# Patient Record
Sex: FEMALE | Race: WHITE | NOT HISPANIC OR LATINO | Employment: UNEMPLOYED | URBAN - NONMETROPOLITAN AREA
[De-identification: names, ages, dates, MRNs, and addresses within clinical notes are randomized per-mention and may not be internally consistent; named-entity substitution may affect disease eponyms.]

---

## 2018-09-10 ENCOUNTER — TRANSCRIBE ORDERS (OUTPATIENT)
Dept: PHYSICAL THERAPY | Facility: HOSPITAL | Age: 43
End: 2018-09-10

## 2018-09-10 DIAGNOSIS — N94.10 UNSPECIFIED DYSPAREUNIA (CODE): Primary | ICD-10-CM

## 2018-09-20 ENCOUNTER — APPOINTMENT (OUTPATIENT)
Dept: PHYSICAL THERAPY | Facility: HOSPITAL | Age: 43
End: 2018-09-20

## 2018-10-01 ENCOUNTER — HOSPITAL ENCOUNTER (OUTPATIENT)
Dept: PHYSICAL THERAPY | Facility: HOSPITAL | Age: 43
Setting detail: THERAPIES SERIES
Discharge: HOME | End: 2018-10-01

## 2018-10-01 DIAGNOSIS — R10.2 PELVIC PAIN IN FEMALE: Primary | ICD-10-CM

## 2018-10-01 PROCEDURE — 97162 PT EVAL MOD COMPLEX 30 MIN: CPT | Performed by: PHYSICAL THERAPIST
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
## 2018-10-17 ENCOUNTER — HOSPITAL ENCOUNTER (OUTPATIENT)
Dept: PHYSICAL THERAPY | Facility: HOSPITAL | Age: 43
Setting detail: THERAPIES SERIES
Discharge: HOME | End: 2018-10-17

## 2018-10-17 DIAGNOSIS — R10.2 PELVIC PAIN IN FEMALE: Primary | ICD-10-CM

## 2018-10-17 PROCEDURE — 97110 THERAPEUTIC EXERCISES: CPT | Performed by: PHYSICAL THERAPIST

## 2018-10-17 NOTE — THERAPY TREATMENT NOTE
Outpatient Physical Therapy Women's Health Treatment Note  [facility]     Patient Name: Clarence Wells  : 1975  MRN: [id number]  Today's Date: 10/17/2018        Visit Date: 10/17/2018  Visit Number:   Recheck: 18  RTD: none    Visit Dx:    ICD-10-CM ICD-9-CM   1. Pelvic pain in female R10.2 625.9       There is no problem list on file for this patient.             Women's Health     Row Name 10/17/18 0800             Subjective Comments    Subjective Comments About the same this morning.  Really needs to start the estrogen medicine.  But admits busy schedule lately with move/transition to new home.  Plans to start the med tonight.    -SW         Pregnancy Questions    Number of Pregnancies 3  -SW      Number of Children 2  -SW      Type of Previous Deliveries Vaginal   complete episiotomy  -SW         Subjective Pain    Able to rate subjective pain? yes  -SW      Pre-Treatment Pain Level 0  -SW         Pelvic Floor Muscle    Strength (Right) 3: Squeeze with/without lift  -SW      Strength (Left) 3: Squeeze with/without lift  -SW      Symmetry of Sustained Maximal Contraction Symmetrical  -SW      Endurance (Ability to Hold Maximal Contraction) 3 sec  -SW      # of Reps of Maximal Contractions while Maintaining Endurance and Strength 0 unable to hold 10 seconds  -SW      Fast Contraction (# of 1 sec contractions performed) 5  -SW      Interior Muscle Comments unchanged pelvic palpation/external appearance.   -SW         Perineal Observation    Perineal Observation Performed? Yes  -SW         Observation of Contraction in Perineum    Anal [place] Absent  -SW      Perineal Body Lift Present  -SW         Pelvic Floor    Ability to Isolate Contraction of Pelvic Floor Yes  -SW      Overflow from Adjacent Muscles Gluteus Tesfaye  -SW         Cough    Leak None  -SW      Bulge Yes  -SW         SEMG Evaluation    Pelvic Floor Electrode Internal Vaginal  -SW      Baseline Resting Yes  -SW      SEMG  Evaluation Comments Elevated resting tone in supine position to 6-7mv. Able to activate PF in isolation with return, but unable to establish baselines of norm value.  Endurance x 10 sec noted with good stability.  Patient needs downtraining.   -         Education Provided On:    Education Points HEP;Behavioral modifications  -      Method of Delivery Verbal;Demonstration;Written  -      Education Provided To Patient  -      Level of Understanding Teach back education performed;Verbalized;Demonstrated  -      HEP Comments please see exercise portion of note for HEP assignment.   -        User Key  (r) = Recorded By, (t) = Taken By, (c) = Cosigned By    Initials Name Provider Type    Genesis Salazar, PT Physical Therapist              PT Ortho     Row Name 10/17/18 0800       Posture/Observations    Posture- WNL Posture is WNL  -SW    Posture/Observations Comments Patient's mood and judgement good. Normal gait and transfers. Alert and oriented.   -      User Key  (r) = Recorded By, (t) = Taken By, (c) = Cosigned By    Initials Name Provider Type    Genesis Salazar, PT Physical Therapist                           PT Assessment/Plan     Row Name 10/17/18 0800          PT Assessment    Functional Limitations Performance in leisure activities;Performance in self-care ADL;Other (comment)   intimacy with partner  -     Impairments Impaired muscle endurance;Impaired muscle length;Muscle strength;Motor function;Pain  -     Assessment Comments Patient shows increased tone to PF which is primary reason for elevated pain to PF.  Discussed and performed downtraining exercises to promote relaxation.  Also gave tips regarding + intimate relations without pain as to break mental association of pain with intimacy.  Patient very encouraged with session.   -     Rehab Potential Good  -     Patient/caregiver participated in establishment of treatment plan and goals Yes  -SW     Patient would benefit  from skilled therapy intervention Yes  -SW        PT Plan    PT Frequency 1x/week  -     Predicted Duration of Therapy Intervention (Therapy Eval) 10-12 visits  -     PT Plan Comments Begin abd massage for relaxation.  Downtrain PF with manual internal work as able. Issue resting position and stretch of SKTC and piriformis next visit.   -       User Key  (r) = Recorded By, (t) = Taken By, (c) = Cosigned By    Initials Name Provider Type    Genesis Salazar, PT Physical Therapist                      Exercises     Row Name 10/17/18 0800             Subjective Comments    Subjective Comments About the same this morning.  Really needs to start the estrogen medicine.  But admits busy schedule lately with move/transition to new home.  Plans to start the med tonight.    -         Subjective Pain    Able to rate subjective pain? yes  -      Pre-Treatment Pain Level 0  -SW      Post-Treatment Pain Level 0  -         Exercise 1    Exercise Name 1 supine SEMG resting  -SW      Additional Comments Elevated resting tone at 6-7mv.    -         Exercise 2    Exercise Name 2 supine SEMG QF PF  -SW      Reps 2 10  -SW      Additional Comments Able to contract the muscle with full return to her baseline of 6-7mv without delay.  Unable to return to baseline of 2.9mv or less.   -         Exercise 3    Exercise Name 3 supine SEMG endurance PF  -SW      Additional Comments Able to sustain contraction for 10 sec with good stability and endurance.  No substitution with overflow of other tissues.   -         Exercise 4    Exercise Name 4 diaphragmatic breathing  -      Time 4 5 min  -      Additional Comments Patient received verbal explanation with demonstration of diaphragmatic breathing with return teach back.  Demonstrated mod I with min cues required for facilitation of PF coordination of elongation and contraction.   -         Exercise 5    Exercise Name 5 toileting position discussed   -       Additional Comments Patient received instruction for toileting position to improve evacuation of bladder with urination.   -         Exercise 6    Exercise Name 6 Education of stimulation with positive experience for intimacy  -      Additional Comments Discussed potential use of lubrication for stimulation that is glycerin free.  Due to fact that patient is able to achieve orgasm via digital stimulation, suggested use of digital stimulation prior to penetration and/or used as form of intimacy to establish + blood flow which inc healing to tissues.  Discussed these items in detail with suggestions provided.  Goal is intimacy of some type with + experience to break mental cycle of association of pain with intimate relations.    -         Exercise 7    Exercise Name 7 downtraining to PF with [place] graph  -      Additional Comments Some improved resting tone noted to PF but remains with difficulty achieving full resting tone to PF.  Needs continuation of training.   -        User Key  (r) = Recorded By, (t) = Taken By, (c) = Cosigned By    Initials Name Provider Type    Genesis Salazar, PT Physical Therapist                            PT OP Goals     Row Name 10/17/18 0800          PT Short Term Goals    STG Date to Achieve 11/01/18  -     STG 1 Patient to demonstrate diaphragmatic breathing for assist in relaxation to PF to promote improved resting tones throughout.   -     STG 1 Progress Ongoing;Progressing  -     STG 2 Patient to demonstrate normal tolieting positions with PF to improve urination and evacuation procedures without pain and full emptying of bladder and bowel.   -     STG 2 Progress Ongoing;Progressing  -     STG 3 patient to demonstrate normal baseline resting tones such that she is consistent at 2.9mv or less x 3 minutes while supine   -     STG 3 Progress New  -     STG 4 patient to recruit PF isolated movement with full return to baseline x 10 reps supine without  hesitancy in return, targeting 10/10 times.   -     STG 4 Progress New  -     STG 5 Patient to tolerate manual release to intravaginal pelvis with reports of dec tenderness to muscles by 50% reduction.   -     STG 5 Progress New  -        Long Term Goals    LTG Date to Achieve 01/01/19  -     LTG 1 FSFI score of 26 or greater implying dec female sexual dysfunction  -     LTG 1 Progress New  -     LTG 2 NIH-CPSI score of 12 or less implying improved function to PF in regards to pain.   -     LTG 2 Progress New  -     LTG 3 patient to improve intimacy with marinoff scale of 0-1 with improved tolerance and less pain.   -     LTG 3 Progress New  -     LTG 4 Resolved PF triggers to intravaginal pelvis to allow palpation without pain.   -     LTG 4 Progress New  -     LTG 5 patient to verbalize improvements of 75% better   -     LTG 5 Progress New  -        Time Calculation    PT Goal Re-Cert Due Date 11/01/18  -       User Key  (r) = Recorded By, (t) = Taken By, (c) = Cosigned By    Initials Name Provider Type    Genesis Salazar, PT Physical Therapist           Therapy Education  Given: HEP, Symptoms/condition management  Program: New  How Provided: Verbal, Demonstration, Written  Provided to: Patient  Level of Understanding: Teach back education performed, Verbalized, Demonstrated              Time Calculation:   Start Time: 0815  Stop Time: 0923  Time Calculation (min): 68 min  Therapy Suggested Charges     Code   Minutes Charges    None           Therapy Charges for Today     Code Description Service Date Service Provider Modifiers Qty    92148494562 HC PT THER PROC EA 15 MIN 10/17/2018 Genesis Park, PT  5                    Genesis Park, PT  10/17/2018

## 2018-10-24 ENCOUNTER — HOSPITAL ENCOUNTER (OUTPATIENT)
Dept: PHYSICAL THERAPY | Facility: HOSPITAL | Age: 43
Setting detail: THERAPIES SERIES
Discharge: HOME | End: 2018-10-24

## 2018-10-24 DIAGNOSIS — R10.2 PELVIC PAIN IN FEMALE: Primary | ICD-10-CM

## 2018-10-24 PROCEDURE — 97110 THERAPEUTIC EXERCISES: CPT | Performed by: PHYSICAL THERAPIST

## 2018-10-24 PROCEDURE — 97140 MANUAL THERAPY 1/> REGIONS: CPT | Performed by: PHYSICAL THERAPIST

## 2018-10-24 NOTE — THERAPY TREATMENT NOTE
Outpatient Physical Therapy Women's Health Treatment Note  [facility]     Patient Name: Clarence Wells  : 1975  MRN: [id number]  Today's Date: 10/24/2018        Visit Date: 10/24/2018  Visit Number 3/3  Recheck: 18, NEXT  RTD: pending scheduling  % improvement: 50%    Visit Dx:    ICD-10-CM ICD-9-CM   1. Pelvic pain in female R10.2 625.9       There is no problem list on file for this patient.             Women's Health     Row Name 10/24/18 0800             Subjective Comments    Subjective Comments All family has been sick with allergies so we haven't had intercourse except once. But does recall that intercourse was more pleasusrable and enjoyable.  Able to achieve orgasm as well.  Very pleased with progress made thus far.  I know that I can relax better now.  That is a huge success for me.  Bladder and bowels doing good.  FU with Dr. Frausto unknown.   -SW         Pregnancy Questions    Number of Pregnancies 3  -SW      Number of Children 2  -SW      Type of Previous Deliveries Vaginal   complete episiotomy  -SW         Subjective Pain    Able to rate subjective pain? yes  -SW      Pre-Treatment Pain Level 0  -SW         Pelvic Floor Muscle    Strength (Right) 3: Squeeze with/without lift  -SW      Strength (Left) 3: Squeeze with/without lift  -SW      Symmetry of Sustained Maximal Contraction Symmetrical  -SW      Endurance (Ability to Hold Maximal Contraction) 3 sec  -SW      # of Reps of Maximal Contractions while Maintaining Endurance and Strength 0 unable to hold 10 seconds  -SW      Fast Contraction (# of 1 sec contractions performed) 5  -SW      Interior Muscle Comments Intravaginal palpation declined this visit.  Abdominal wall with mild triggering noted along descending colon and transverse colon.  + bowel sounds noted.    -SW         Perineal Observation    Perineal Observation Performed? --  -SW         Observation of Contraction in Perineum    Anal [place] --  -SW      Perineal Body  Lift --  -SW         Pelvic Floor    Ability to Isolate Contraction of Pelvic Floor Yes  -SW      Overflow from Adjacent Muscles Gluteus Tesfaye  -SW         Cough    Leak None  -SW      Bulge Yes  -SW         SEMG Evaluation    SEMG Evaluation Comments declined this visit.    -SW         Education Provided On:    Education Points HEP;Behavioral modifications  -SW      Method of Delivery Verbal;Demonstration;Written  -SW      Education Provided To Patient  -SW      Level of Understanding Teach back education performed;Verbalized;Demonstrated  -SW        User Key  (r) = Recorded By, (t) = Taken By, (c) = Cosigned By    Initials Name Provider Type    Genesis Salazar, PT Physical Therapist              PT Ortho     Row Name 10/24/18 0800       Posture/Observations    Posture- WNL Posture is WNL  -SW    Posture/Observations Comments Good mood and judgement this date.  Patient encouraged in spirit and happy demeanor with discussion of progress. Gait unremarkable as were transfers.  Independent and without assist required.   -SW      User Key  (r) = Recorded By, (t) = Taken By, (c) = Cosigned By    Initials Name Provider Type    Genesis Salazar, PT Physical Therapist                           PT Assessment/Plan     Row Name 10/24/18 0800          PT Assessment    Functional Limitations Performance in leisure activities;Performance in self-care ADL;Other (comment)   intimacy with partner  -SW     Impairments Impaired muscle endurance;Impaired muscle length;Muscle strength;Motor function;Pain  -SW     Assessment Comments Patient began stretching program with good progress.  Patient admits to improved tolerance and pleasure with intercourse.  Patient admits to being able to relax with improvements to point of orgasm in past experience this week. Anticipate continual progression as patient continues with HEP and relaxation techniques.  Abdominal massage used as relaxation tool as well that patient may utilize  "prior bedtime/intercourse etc.  Patient very compliant with recommendations.  Anticipate full recovery at this time with pelvic pain.  Patient has positive support system in place.    -     Rehab Potential Good  -     Patient/caregiver participated in establishment of treatment plan and goals Yes  -SW     Patient would benefit from skilled therapy intervention Yes  -SW        PT Plan    PT Frequency 1x/week  -     Predicted Duration of Therapy Intervention (Therapy Eval) 10-12 visits  -     PT Plan Comments Reassess PF with SEMG.  Review stretching.    -       User Key  (r) = Recorded By, (t) = Taken By, (c) = Cosigned By    Initials Name Provider Type    SW Genesis Park, PT Physical Therapist                      Exercises     Row Name 10/24/18 0900 10/24/18 0800          Subjective Comments    Subjective Comments  -- All family has been sick with allergies so we haven't had intercourse except once. But does recall that intercourse was more pleasusrable and enjoyable.  Able to achieve orgasm as well.  Very pleased with progress made thus far.  I know that I can relax better now.  That is a huge success for me.  Bladder and bowels doing good.  FU with Dr. Frausto unknown.   -        Subjective Pain    Able to rate subjective pain?  -- yes  -SW     Pre-Treatment Pain Level  -- 0  -SW        Total Minutes    89796 - PT Therapeutic Exercise Minutes  -- 40  -SW     87937 - PT Manual Therapy Minutes 15  -SW  --        Exercise 1    Exercise Name 1  -- Hamstring stretch   -SW     Reps 1  -- 3  -SW     Time 1  -- 30 sec  -SW        Exercise 2    Exercise Name 2  -- Adductor stretch   -SW     Reps 2  -- 3  -SW     Time 2  -- 30\"sec  -SW        Exercise 3    Exercise Name 3  -- piriformis stretch   -SW     Reps 3  -- 3  -SW     Time 3  -- 30\" sec  -SW        Exercise 4    Exercise Name 4  -- SKTC  -SW     Reps 4  -- 3  -SW     Time 4  -- 30\"sec  -SW        Exercise 5    Exercise Name 5  -- diaphragmatic " breathing  -     Time 5  -- 5 min  -     Additional Comments  -- Patient able to successfully demonstrate diaphragmatic breathing with good effort and accurate performance.   -        Exercise 6    Exercise Name 6  -- PF excursion with verbal cues   -     Additional Comments  -- Used diaphragmatic breathing with excursion to provide elongation of PF.   -        Exercise 7    Exercise Name 7  -- --  -       User Key  (r) = Recorded By, (t) = Taken By, (c) = Cosigned By    Initials Name Provider Type    Genesis Salazar, PT Physical Therapist           Manual Rx (last 36 hours)      Manual Treatments     Row Name 10/24/18 0900             Total Minutes    77232 - PT Manual Therapy Minutes 15  -SW         Manual Rx 1    Manual Rx 1 Location Abdominal massage  -      Manual Rx 1 Type MFR  -        User Key  (r) = Recorded By, (t) = Taken By, (c) = Cosigned By    Initials Name Provider Type    Genesis Salazar, PT Physical Therapist                          PT OP Goals     Row Name 10/24/18 0800          PT Short Term Goals    STG Date to Achieve 11/01/18  -     STG 1 Patient to demonstrate diaphragmatic breathing for assist in relaxation to PF to promote improved resting tones throughout.   -     STG 1 Progress Met  -     STG 1 Progress Comments very aware of breathing with relaxation this visit.   -     STG 2 Patient to demonstrate normal tolieting positions with PF to improve urination and evacuation procedures without pain and full emptying of bladder and bowel.   -     STG 2 Progress Met  -     STG 3 patient to demonstrate normal baseline resting tones such that she is consistent at 2.9mv or less x 3 minutes while supine   -     STG 3 Progress New  -     STG 4 patient to recruit PF isolated movement with full return to baseline x 10 reps supine without hesitancy in return, targeting 10/10 times.   -     STG 4 Progress New  -     STG 5 Patient to tolerate manual release  to intravaginal pelvis with reports of dec tenderness to muscles by 50% reduction.   -     STG 5 Progress New  -        Long Term Goals    LTG Date to Achieve 01/01/19  -     LTG 1 FSFI score of 26 or greater implying dec female sexual dysfunction  -     LTG 1 Progress New  -     LTG 2 NIH-CPSI score of 12 or less implying improved function to PF in regards to pain.   -     LTG 2 Progress New  -     LTG 3 patient to improve intimacy with marinoff scale of 0-1 with improved tolerance and less pain.   -     LTG 3 Progress New  -     LTG 4 Resolved PF triggers to intravaginal pelvis to allow palpation without pain.   -     LTG 4 Progress New  -     LTG 5 patient to verbalize improvements of 75% better   -     LTG 5 Progress New  -        Time Calculation    PT Goal Re-Cert Due Date 11/01/18  -       User Key  (r) = Recorded By, (t) = Taken By, (c) = Cosigned By    Initials Name Provider Type    Genesis Salazar, PT Physical Therapist           Therapy Education  Given: HEP  Program: Progressed  How Provided: Verbal, Demonstration, Written  Provided to: Patient  Level of Understanding: Teach back education performed, Verbalized, Demonstrated              Time Calculation:   Start Time: 0825  Stop Time: 0923  Time Calculation (min): 58 min  Therapy Suggested Charges     Code   Minutes Charges    09299 (CPT®)  Pt Neuromusc Re Education Ea 15 Min      46343 (CPT®) Hc Pt Ther Proc Ea 15 Min 40 3    08843 (CPT®) Hc Gait Training Ea 15 Min      40380 (CPT®) Hc Pt Therapeutic Act Ea 15 Min      19983 (CPT®) Hc Pt Manual Therapy Ea 15 Min 15 1    63223 (CPT®) Hc Pt Ther Massage- Per 15 Min      98608 (CPT®) Hc Pt Iontophoresis Ea 15 Min      76979 (CPT®)  Pt Elec Stim Ea-Per 15 Min      78993 (CPT®) Hc Pt Ultrasound Ea 15 Min      23425 (CPT®) Hc Pt Self Care/Mgmt/Train Ea 15 Min      11638 (CPT®)  Pt Prosthetic (S) Train Initial Encounter, Each 15 Min      17126 (CPT®)  Orthotic(S)  Mgmt/Train Initial Encounter, Each 15min      79532 (CPT®) Hc Pt Aquatic Therapy Ea 15 Min      66048 (CPT®) Hc Pt Orthotic(S)/Prosthetic(S) Encounter, Each 15 Min       (CPT®) Hc Pt Electrical Stim Unattended      Total  55 4        Therapy Charges for Today     Code Description Service Date Service Provider Modifiers Qty    82187511325 HC PT THER PROC EA 15 MIN 10/24/2018 Genesis Park, PT GP 3    02981194869 HC PT MANUAL THERAPY EA 15 MIN 10/24/2018 Genesis Park, PT GP 1                    Genesis Park, PT  10/24/2018

## 2018-10-31 ENCOUNTER — APPOINTMENT (OUTPATIENT)
Dept: PHYSICAL THERAPY | Facility: HOSPITAL | Age: 43
End: 2018-10-31

## 2018-11-07 ENCOUNTER — HOSPITAL ENCOUNTER (OUTPATIENT)
Dept: PHYSICAL THERAPY | Facility: HOSPITAL | Age: 43
Setting detail: THERAPIES SERIES
Discharge: HOME | End: 2018-11-07

## 2018-11-07 DIAGNOSIS — R10.2 PELVIC PAIN IN FEMALE: Primary | ICD-10-CM

## 2018-11-07 PROCEDURE — 97110 THERAPEUTIC EXERCISES: CPT | Performed by: PHYSICAL THERAPIST

## 2018-11-07 NOTE — THERAPY TREATMENT NOTE
Outpatient Physical Therapy Women's Health Re-Evaluation  [facility]     Patient Name: Clarence Wells  : 1975  MRN: [id number]  Today's Date: 2018        Visit Date: 2018  Visit Number:   Recheck: 18  RTD: pending  % improvement: 50%    There is no problem list on file for this patient.       Past Medical History:   Diagnosis Date   • Anemia    • Fluid retention in legs    • Hypertension    • Migraines    • OCD (obsessive compulsive disorder)    • Pelvic pain in female         Past Surgical History:   Procedure Laterality Date   • BILATERAL BREAST REDUCTION     • BLADDER SLING MODIFIED, ANTERIOR AND POSTERIOR VAGINAL REPAIR     • HYSTERECTOMY  2018    complete; [place]         Visit Dx:    ICD-10-CM ICD-9-CM   1. Pelvic pain in female R10.2 625.9                 Women's Health     Row Name 18 0900             Pregnancy Questions    Number of Pregnancies 3  -SW      Number of Children 2  -SW      Type of Previous Deliveries Vaginal   complete episiotomy  -SW         Subjective Pain    Post-Treatment Pain Level 0  -SW         Pelvic Floor Muscle    Strength (Right) 3: Squeeze with/without lift  -SW      Strength (Left) 3: Squeeze with/without lift  -SW      Symmetry of Sustained Maximal Contraction Symmetrical  -SW      Endurance (Ability to Hold Maximal Contraction) 3 sec  -SW      # of Reps of Maximal Contractions while Maintaining Endurance and Strength 0 unable to hold 10 seconds  -SW      Fast Contraction (# of 1 sec contractions performed) 5  -SW      Interior Muscle Comments Perineum continues with good position.  Anal wink positive this visit due to inc muscle recruitment.  MMT 3/5.  Tenderness noted along ischiocavernosus bilaterally but more on L side.  Slight trigger noted along sup transverse perineal on L side as well.    -SW         Perineal Observation    Perineal Observation Performed? Yes  -SW         Observation of Contraction in Perineum    Anal [place]  Present  -SW      Perineal Body Lift Present  -SW         Pelvic Floor    Ability to Isolate Contraction of Pelvic Floor Yes  -SW      Overflow from Adjacent Muscles Gluteus Tesfaye  -SW         Cough    Leak None  -SW      Bulge Yes  -SW         SEMG Evaluation    Pelvic Floor Electrode Internal Vaginal  -SW      Baseline Resting Yes  -SW      SEMG Evaluation Comments Excellent SEMG assessment this date.  Increased muscle recruitment noted with improved isolation and coordination of muscle control.  Resting tones normal.  QF amplitudes around 20-25 mv and endurance 12-18mv x 10 sec hold with good stability and quick return to baseline resting following contractions.   -SW         Education Provided On:    Education Points HEP;Behavioral modifications  -SW      Method of Delivery Verbal;Demonstration;Written  -SW      Education Provided To Patient  -SW      Level of Understanding Teach back education performed;Verbalized;Demonstrated  -SW      HEP Comments continue with PF training exercises: 10 sec hold x 10 reps , 2 cycles am/pm.  QF post void contraction.  Encourage lubricant with intercourse at all times.  -SW        User Key  (r) = Recorded By, (t) = Taken By, (c) = Cosigned By    Initials Name Provider Type    Genesis Salazar, PT Physical Therapist              PT Ortho     Row Name 11/07/18 0900       Subjective Pain    Able to rate subjective pain? yes  -SW    Pre-Treatment Pain Level 0  -SW       Posture/Observations    Posture- WNL Posture is WNL  -SW    Posture/Observations Comments No distress in appearance. Happy demeanor.  Laughing and smiling upon initiation of PT visit.    -SW      User Key  (r) = Recorded By, (t) = Taken By, (c) = Cosigned By    Initials Name Provider Type    Genesis Salazar PT Physical Therapist                           PT Assessment/Plan     Row Name 11/07/18 0900          PT Assessment    Functional Limitations Performance in leisure activities;Performance in  self-care ADL;Other (comment)   intimacy with partner  -SW     Impairments Impaired muscle endurance;Impaired muscle length;Muscle strength;Motor function;Pain  -SW     Assessment Comments Patient reports improvements of pelvic pain with re: to intercourse 50%.  She is using lubricant now which has tremendously helped with ability to participate with less pain.  She demonstrated improved muscle recruitment this date with inc amplitudes of contraction.  Patient is pleased with progress.  She is showing improved mental health in regards to pleasure in sexual intimacy as well.  Overall excellent progression with pelvic floor program.  Continues with mild triggers noted internally but definitely improving. All STG met thus far and now moving toward achievement of LTG.  -SW     Rehab Potential Good  -SW     Patient/caregiver participated in establishment of treatment plan and goals Yes  -SW     Patient would benefit from skilled therapy intervention Yes  -SW        PT Plan    PT Frequency 1x/week  -SW     Predicted Duration of Therapy Intervention (Therapy Eval) 10-12 visits  -SW     PT Plan Comments Continue with internal release.  Transition SEMG to seated training as able.   -SW       User Key  (r) = Recorded By, (t) = Taken By, (c) = Cosigned By    Initials Name Provider Type    Genesis Salazar, PT Physical Therapist                  Exercises     Row Name 11/07/18 0900             Subjective Comments    Subjective Comments Had a migraine last week and could not make appt.  Has been making sure that she is well lubricated.  Did better with lubrication.  Did better with intercourse overall.  Subjective improvement: 50%.  Has been using the stretches as instructed.   -SW         Subjective Pain    Able to rate subjective pain? yes  -SW      Pre-Treatment Pain Level 0  -SW      Post-Treatment Pain Level 0  -SW         Exercise 1    Exercise Name 1 SEMG resting supine  -SW      Time 1 3' min   -SW      Additional  Comments Normal baseline readings continuously.  -SW         Exercise 2    Exercise Name 2 supine SEMG QF  -SW      Sets 2 2  -SW      Reps 2 10  -SW      Additional Comments Able to show improved recruitment patterns of PF.  Amplitudes inc to 20-25mv with full return to baseline.  -SW         Exercise 3    Exercise Name 3 supine SEMG endurance  -SW      Sets 3 3  -SW      Reps 3 10  -SW      Time 3 10 sec  -SW      Additional Comments Good stability noted to PF.  Endurance increased significantly with good control.  Amplitudes range from 12-18 mv.    -SW        User Key  (r) = Recorded By, (t) = Taken By, (c) = Cosigned By    Initials Name Provider Type    Genesis Salazar, PT Physical Therapist           Manual Rx (last 36 hours)      Manual Treatments     Row Name 11/07/18 0900             Manual Rx 1    Manual Rx 1 Location Manual Tissue release Intravaginally  -SW      Manual Rx 1 Type trigger release; MFR  -SW      Manual Rx 1 Duration 15  -SW        User Key  (r) = Recorded By, (t) = Taken By, (c) = Cosigned By    Initials Name Provider Type    Genesis Salazar, PT Physical Therapist                          PT OP Goals     Row Name 11/07/18 0900          PT Short Term Goals    STG Date to Achieve 11/01/18  -     STG 1 Patient to demonstrate diaphragmatic breathing for assist in relaxation to PF to promote improved resting tones throughout.   -     STG 1 Progress Met  -     STG 2 Patient to demonstrate normal tolieting positions with PF to improve urination and evacuation procedures without pain and full emptying of bladder and bowel.   -     STG 2 Progress Met  -     STG 3 patient to demonstrate normal baseline resting tones such that she is consistent at 2.9mv or less x 3 minutes while supine   -     STG 3 Progress Met  -     STG 4 patient to recruit PF isolated movement with full return to baseline x 10 reps supine without hesitancy in return, targeting 10/10 times.   -     STG  4 Progress Met  -     STG 5 Patient to tolerate manual release to intravaginal pelvis with reports of dec tenderness to muscles by 50% reduction.   -     STG 5 Progress Met  -        Long Term Goals    LTG Date to Achieve 01/01/19  -     LTG 1 FSFI score of 26 or greater implying dec female sexual dysfunction  -     LTG 1 Progress New  -     LTG 2 NIH-CPSI score of 12 or less implying improved function to PF in regards to pain.   -     LTG 2 Progress New  -     LTG 3 patient to improve intimacy with marinoff scale of 0-1 with improved tolerance and less pain.   -     LTG 3 Progress New  -     LTG 4 Resolved PF triggers to intravaginal pelvis to allow palpation without pain.   -     LTG 4 Progress New  -     LTG 5 patient to verbalize improvements of 75% better   -     LT 5 Progress New  -        Time Calculation    PT Goal Re-Cert Due Date 12/07/18  -       User Key  (r) = Recorded By, (t) = Taken By, (c) = Cosigned By    Initials Name Provider Type    Genesis Salazar, PT Physical Therapist          Therapy Education  Given: HEP, Symptoms/condition management  Program: Progressed  How Provided: Verbal, Demonstration, Written  Provided to: Patient  Level of Understanding: Teach back education performed, Demonstrated, Verbalized     Outcome Measure Options: Other Outcome Measure  Other Outcome Measure Tool Used  Other Outcome Measure Tool Comments: NIH-CPSI 17; Pain 10; urinary 3, QOL 4      Time Calculation:   Start Time: 0930  Stop Time: 1026  Time Calculation (min): 56 min  Therapy Suggested Charges     Code   Minutes Charges    None           Therapy Charges for Today     Code Description Service Date Service Provider Modifiers Qty    48276747131  PT THER PROC EA 15 MIN 11/7/2018 Genesis Park, PT GP 4          PT G-Codes  Outcome Measure Options: Other Outcome Measure       Genesis Park PT  11/7/2018

## 2018-11-28 ENCOUNTER — APPOINTMENT (OUTPATIENT)
Dept: PHYSICAL THERAPY | Facility: HOSPITAL | Age: 43
End: 2018-11-28

## 2018-12-05 ENCOUNTER — HOSPITAL ENCOUNTER (OUTPATIENT)
Dept: PHYSICAL THERAPY | Facility: HOSPITAL | Age: 43
Setting detail: THERAPIES SERIES
Discharge: HOME | End: 2018-12-05

## 2018-12-05 DIAGNOSIS — R10.2 PELVIC PAIN IN FEMALE: Primary | ICD-10-CM

## 2018-12-05 PROCEDURE — 97140 MANUAL THERAPY 1/> REGIONS: CPT | Performed by: PHYSICAL THERAPIST

## 2018-12-05 PROCEDURE — 97110 THERAPEUTIC EXERCISES: CPT | Performed by: PHYSICAL THERAPIST

## 2018-12-05 NOTE — THERAPY RE-EVALUATION
Outpatient Physical Therapy Women's Health Re-Evaluation  [facility]     Patient Name: Clarence Wells  : 1975  MRN: [id number]  Today's Date: 2018        Visit Date: 2018  Visit number:   Recheck: 19  RTD:   % improvement: 50%    There is no problem list on file for this patient.       Past Medical History:   Diagnosis Date   • Anemia    • Fluid retention in legs    • Hypertension    • Migraines    • OCD (obsessive compulsive disorder)    • Pelvic pain in female         Past Surgical History:   Procedure Laterality Date   • BILATERAL BREAST REDUCTION     • BLADDER SLING MODIFIED, ANTERIOR AND POSTERIOR VAGINAL REPAIR     • HYSTERECTOMY  2018    complete; [place]         Visit Dx:    ICD-10-CM ICD-9-CM   1. Pelvic pain in female R10.2 625.9           [facility]     Row Name 18 1300             Subjective Comments    Subjective Comments  Has struggled with migraines.  Reason for missed last visit.  Pelvic pain has been good.  Only one episode where it hurt.  But hasn't had a whole lot of sex since last visit.  But feels she is good.  Subjective improvement: 50%.  During average day, no pain reported.  Learning to lub well and believes that it has a lot to do with it.  Now taking Estradiol again.   -SW         Pregnancy Questions    Number of Pregnancies  3  -SW      Number of Children  2  -SW      Type of Previous Deliveries  Vaginal complete episiotomy  -SW         Subjective Pain    Able to rate subjective pain?  yes  -SW      Pre-Treatment Pain Level  0  -SW         Pelvic Floor Muscle    Strength (Right)  3: Squeeze with/without lift  -SW      Strength (Left)  3: Squeeze with/without lift  -SW      Symmetry of Sustained Maximal Contraction  Symmetrical  -SW      Endurance (Ability to Hold Maximal Contraction)  10 sec  -SW      # of Reps of Maximal Contractions while Maintaining Endurance and Strength  10 sets  -SW      Fast Contraction (# of 1 sec contractions  performed)  10  -SW      Interior Muscle Comments  cont obt internus muscle ttp L>R.  Superficial transverse perineal ttp.  Ischiocavernosus also tender, but focal spot this date was on obt internus.   -SW         Perineal Observation    Perineal Observation Performed?  Yes  -SW         Observation of Contraction in Perineum    Anal [place]  Present  -SW      Perineal Body Lift  Present  -SW         Pelvic Floor    Ability to Isolate Contraction of Pelvic Floor  Yes  -SW      Overflow from Adjacent Muscles  --  -SW         Cough    Leak  None  -SW      Bulge  Yes  -SW         SEMG Evaluation    Pelvic Floor Electrode  Internal Vaginal  -SW      Baseline Resting  Yes  -SW      SEMG Evaluation Comments  Normal resting tone achieved at supine.  Coordination and ms control in supine normal.  Seated some discomfort with sensor placement.  able to achieve 2.9mv. intermittently pre/post contraction. shows good elevation of resting tone in seated position.  some delay in relaxation. Endurance good x 10 sec hold.   -         Education Provided On:    Education Points  HEP;Behavioral modifications  -      Method of Delivery  Verbal;Demonstration;Written  -      Education Provided To  Patient  -      Level of Understanding  Teach back education performed;Verbalized;Demonstrated  -      HEP Comments  Initiate PF training in seated position.  -        User Key  (r) = Recorded By, (t) = Taken By, (c) = Cosigned By    Initials Name Provider Type    SW Genesis Park, PT Physical Therapist        PT Ortho     Row Name 12/05/18 1300       Subjective Pain    Post-Treatment Pain Level  0  -    Subjective Pain Comment  sore post treatment but no pain.  -SW       Posture/Observations    Posture- WNL  Posture is WNL  -SW    Posture/Observations Comments  distressed appearance of patient; lethargic appearance.  alert and oriented.   -      User Key  (r) = Recorded By, (t) = Taken By, (c) = Cosigned By    Initials Name  Provider Type    Genesis Salazar, PT Physical Therapist                     PT Assessment/Plan     Row Name 12/05/18 1300          PT Assessment    Functional Limitations  Performance in leisure activities;Performance in self-care ADL;Other (comment) intimacy with partner  -     Impairments  Impaired muscle endurance;Impaired muscle length;Muscle strength;Motor function;Pain  -     Assessment Comments  Patient notes she is improving at 50%.  She recognizes continuation of mild discomfort with intercourse, but notes significant improvement since starting therapy.  She also demonstrates improved motor control in supine and now seated positions.  Despite discomfort with seated position, she is now able to achieve readily stable resting tone to PF in supine and seated positions.  Intravaginal cuff remains tender.  This visit, obt internus and sup transverse mus tender to touch.  Improved relaxation to abdominal fascia with good motility.  Progressing with all STG achieved.  Now working toward LTG achievement.   -     Rehab Potential  Good  -     Patient/caregiver participated in establishment of treatment plan and goals  Yes  -SW     Patient would benefit from skilled therapy intervention  Yes  -SW        PT Plan    PT Frequency  1x/week  -     Predicted Duration of Therapy Intervention (Therapy Eval)  10-12 visits  -     PT Plan Comments  continue with internal release as necessary; retrain in seated/standing position.  -       User Key  (r) = Recorded By, (t) = Taken By, (c) = Cosigned By    Initials Name Provider Type    Genesis Salazar, PT Physical Therapist            Exercises     Row Name 12/05/18 1500 12/05/18 1300          Subjective Comments    Subjective Comments  --  Has struggled with migraines.  Reason for missed last visit.  Pelvic pain has been good.  Only one episode where it hurt.  But hasn't had a whole lot of sex since last visit.  But feels she is good.  Subjective  improvement: 50%.  During average day, no pain reported.  Learning to lub well and believes that it has a lot to do with it.  Now taking Estradiol again.   -SW        Subjective Pain    Able to rate subjective pain?  --  yes  -SW     Pre-Treatment Pain Level  --  0  -SW     Post-Treatment Pain Level  --  0  -SW     Subjective Pain Comment  --  sore post treatment but no pain.  -SW        Total Minutes    88922 - PT Therapeutic Exercise Minutes  --  45  -SW     73682 - PT Manual Therapy Minutes  15  -SW  --        Exercise 1    Exercise Name 1  --  supine resting  -SW     Additional Comments  --  intially begins with slight elevation of tone; with deep breathing and inc focus was able to obtain normal consistent resting tone.   -SW        Exercise 2    Exercise Name 2  --  supine QF  -SW     Sets 2  --  2  -SW     Reps 2  --  10  -SW     Additional Comments  --  Able to demo normal contractions of PF with full relaxation to baselines without delay.   -SW        Exercise 3    Exercise Name 3  --  supine endurance  -SW     Sets 3  --  2  -SW     Reps 3  --  10  -SW     Time 3  --  10 sec  -SW     Additional Comments  --  Good stability x 10 sec hold with full return to baseline  -SW        Exercise 4    Exercise Name 4  --  seated resting  -SW     Additional Comments  --  initially elevated as patient experienced it a challenge to find comfort with positioning of sensor.  Once situated, able to achieve normalized tone for most part.  Slight elevation at 3.2 occasional but relatively normal in this position.  -SW        Exercise 5    Exercise Name 5  --  seated QF  -SW     Reps 5  --  10  -SW     Additional Comments  --  Able to show good recruitment of ms in seated position wtih amplitudes of 15 mv.  -        Exercise 6    Exercise Name 6  --  endurance seated   -SW     Reps 6  --  5  -SW     Time 6  --  10'sec  -SW     Additional Comments  --  good stability with recruitment  -SW        Exercise 7    Exercise Name 7   --  standing resting  -     Additional Comments  --  elevated to 3.4-4.0 initially.  Patient was able to push out slightly and obtain normal baselines of 2.9mv. Required more focus to obtain normal baselines.   -       User Key  (r) = Recorded By, (t) = Taken By, (c) = Cosigned By    Initials Name Provider Type    Genesis Salazar, PT Physical Therapist           Manual Rx (last 36 hours)      Manual Treatments     Row Name 12/05/18 1500             Total Minutes    63452 - PT Manual Therapy Minutes  15  -SW         Manual Rx 1    Manual Rx 1 Location  Intravaginal tissue release obt internus; sup trans perineal, ischiocavernosus  -SW      Manual Rx 1 Type  trigger release; MFR  -SW        User Key  (r) = Recorded By, (t) = Taken By, (c) = Cosigned By    Initials Name Provider Type    VERONICA Park Genesismemo Parry, PT Physical Therapist                    PT OP Goals     Row Name 12/05/18 1300          PT Short Term Goals    STG Date to Achieve  11/01/18  -     STG 1  Patient to demonstrate diaphragmatic breathing for assist in relaxation to PF to promote improved resting tones throughout.   -     STG 1 Progress  Met  -     STG 2  Patient to demonstrate normal tolieting positions with PF to improve urination and evacuation procedures without pain and full emptying of bladder and bowel.   -     STG 2 Progress  Met  -     STG 3  patient to demonstrate normal baseline resting tones such that she is consistent at 2.9mv or less x 3 minutes while supine   -     STG 3 Progress  Met  [facility]     STG 4  patient to recruit PF isolated movement with full return to baseline x 10 reps supine without hesitancy in return, targeting 10/10 times.   -     STG 4 Progress  Met  [facility]     STG 5  Patient to tolerate manual release to intravaginal pelvis with reports of dec tenderness to muscles by 50% reduction.   -     STG 5 Progress  Met  -        Long Term Goals    LTG Date to Achieve  01/04/19  -     LTG 1  FSFI  score of 26 or greater implying dec female sexual dysfunction  -SW     LTG 1 Progress  Ongoing;Progressing  -SW     LTG 2  NIH-CPSI score of 12 or less implying improved function to PF in regards to pain.   -SW     LTG 2 Progress  Ongoing;Progressing  -SW     LTG 3  patient to improve intimacy with marinoff scale of 0-1 with improved tolerance and less pain.   -SW     LTG 3 Progress  Ongoing;Progressing  -SW     LTG 4  Resolved PF triggers to intravaginal pelvis to allow palpation without pain.   -SW     LTG 4 Progress  New  -     LTG 5  patient to verbalize improvements of 75% better   -     LTG 5 Progress  New  -        Time Calculation    PT Goal Re-Cert Due Date  01/04/19  -       User Key  (r) = Recorded By, (t) = Taken By, (c) = Cosigned By    Initials Name Provider Type    Genesis Salazar, PT Physical Therapist                          Time Calculation:   Start Time: 1309  Stop Time: 1412  Time Calculation (min): 63 min  Therapy Suggested Charges     Code   Minutes Charges    57895 (CPT®) Hc Pt Neuromusc Re Education Ea 15 Min      46737 (CPT®) Hc Pt Ther Proc Ea 15 Min 45 3    31844 (CPT®) Hc Gait Training Ea 15 Min      71280 (CPT®) Hc Pt Therapeutic Act Ea 15 Min      96298 (CPT®) Hc Pt Manual Therapy Ea 15 Min 15 1    58684 (CPT®) Hc Pt Ther Massage- Per 15 Min      57033 (CPT®) Hc Pt Iontophoresis Ea 15 Min      12144 (CPT®) Hc Pt Elec Stim Ea-Per 15 Min      50567 (CPT®) Hc Pt Ultrasound Ea 15 Min      28765 (CPT®) Hc Pt Self Care/Mgmt/Train Ea 15 Min      96239 (CPT®) Hc Pt Prosthetic (S) Train Initial Encounter, Each 15 Min      92682 (CPT®) Hc Orthotic(S) Mgmt/Train Initial Encounter, Each 15min      10439 (CPT®) Hc Pt Aquatic Therapy Ea 15 Min      46105 (CPT®) Hc Pt Orthotic(S)/Prosthetic(S) Encounter, Each 15 Min       (CPT®) Hc Pt Electrical Stim Unattended      Total  60 4        Therapy Charges for Today     Code Description Service Date Service Provider Modifiers Qty     11660712287  PT THER PROC EA 15 MIN 12/5/2018 Genesis Park, PT GP 3    85789318642 HC PT MANUAL THERAPY EA 15 MIN 12/5/2018 Genesis Park, PT GP 1                  Genesis Park, PT  12/5/2018

## 2018-12-12 ENCOUNTER — HOSPITAL ENCOUNTER (OUTPATIENT)
Dept: PHYSICAL THERAPY | Facility: HOSPITAL | Age: 43
Setting detail: THERAPIES SERIES
Discharge: HOME | End: 2018-12-12

## 2018-12-12 DIAGNOSIS — R10.2 PELVIC PAIN IN FEMALE: Primary | ICD-10-CM

## 2018-12-12 PROCEDURE — 97110 THERAPEUTIC EXERCISES: CPT | Performed by: PHYSICAL THERAPIST

## 2018-12-12 NOTE — THERAPY TREATMENT NOTE
Outpatient Physical Therapy Women's Health Treatment Note  [facility]     Patient Name: Clarence Wells  : 1975  MRN: [id number]  Today's Date: 2018        Visit Date: 2018  Visit number:   Recheck: 19  RTD: pending  % improvement: 60%    Visit Dx:    ICD-10-CM ICD-9-CM   1. Pelvic pain in female R10.2 625.9       There is no problem list on file for this patient.       Women's Health     Row Name 18 1400             Pregnancy Questions    Number of Pregnancies  3  -SW      Number of Children  2  -SW      Type of Previous Deliveries  Vaginal complete episiotomy  -SW         Subjective Pain    Able to rate subjective pain?  yes  -SW      Pre-Treatment Pain Level  0  -SW         Pelvic Floor Muscle    Symmetry of Sustained Maximal Contraction  Symmetrical  -SW      Endurance (Ability to Hold Maximal Contraction)  10 sec  -SW      # of Reps of Maximal Contractions while Maintaining Endurance and Strength  10 sets  -SW      Fast Contraction (# of 1 sec contractions performed)  10  -SW      Interior Muscle Comments  no internal assessment completed this date.   -SW         Perineal Observation    Perineal Observation Performed?  Yes  -SW         Observation of Contraction in Perineum    Anal [place]  Present  -SW      Perineal Body Lift  Present  -SW         Pelvic Floor    Ability to Isolate Contraction of Pelvic Floor  Yes  -SW         Cough    Leak  None  -SW      Bulge  Yes  -SW         SEMG Evaluation    Pelvic Floor Electrode  Internal Vaginal  -SW      Baseline Resting  Yes  -SW      SEMG Evaluation Comments  see therapy notes of exercise for specifics.   -SW         Education Provided On:    Education Points  HEP;Behavioral modifications  -SW      Method of Delivery  Verbal;Demonstration;Written  -SW      Education Provided To  Patient  -SW      Level of Understanding  Teach back education performed;Verbalized;Demonstrated  -SW      HEP Comments  continue HEP as assigned.    -SW        User Key  (r) = Recorded By, (t) = Taken By, (c) = Cosigned By    Initials Name Provider Type    Genesis Salazar, PT Physical Therapist        PT Ortho     Row Name 12/12/18 1400       Subjective Comments    Subjective Comments  Battling migraines over the past week and has not had as much intercourse as usual.  Notes that she did achieve orgasm with intercourse. No pain at all today.  No discomfort with treatment.  Subjective improvement: 60%. Stomach has been upset this date, possibly due to something she had to eat.  Has been in BR a lot today.   -SW       Subjective Pain    Post-Treatment Pain Level  0  -SW       Posture/Observations    Posture- WNL  Posture is WNL  -SW    Posture/Observations Comments  Patient requested Bathroom activity x 2 during treatment due to upset stomach.  + bowel activity noted with diarrhea.  Limited treatment time due to upset stomach.   -SW      User Key  (r) = Recorded By, (t) = Taken By, (c) = Cosigned By    Initials Name Provider Type    Genesis Salazar, PT Physical Therapist                     PT Assessment/Plan     Row Name 12/12/18 1400          PT Assessment    Functional Limitations  Performance in leisure activities;Performance in self-care ADL;Other (comment) intimacy with partner  -SW     Impairments  Impaired muscle endurance;Impaired muscle length;Muscle strength;Motor function;Pain  -SW     Assessment Comments  Patient is reporting continued improvement with sexual intimacy.  She has now been able to achieve orgasm with intimacy and no residual pain.  She is optimistic but cautious with participation still.  Overall PF assessment much improved this date with SEMG training.  Fully able to achieve resting in all positions with effective, stabilized PF recruitment without substitution. Patient pleased with progress.    -SW     Rehab Potential  Good  -SW     Patient/caregiver participated in establishment of treatment plan and goals  Yes  -SW      Patient would benefit from skilled therapy intervention  Yes  -SW        PT Plan    PT Frequency  -- FU on 1/2/19 due to holidays  -SW     Predicted Duration of Therapy Intervention (Therapy Eval)  10-12 visits  -     PT Plan Comments  Continue with manual as indicated.  Reassess FQ next visit.   -       User Key  (r) = Recorded By, (t) = Taken By, (c) = Cosigned By    Initials Name Provider Type    SW Genesis Park, PT Physical Therapist                Exercises     Row Name 12/12/18 1400             Subjective Comments    Subjective Comments  Battling migraines over the past week and has not had as much intercourse as usual.  Notes that she did achieve orgasm with intercourse. No pain at all today.  No discomfort with treatment.  Subjective improvement: 60%. Stomach has been upset this date, possibly due to something she had to eat.  Has been in BR a lot today.   -SW         Subjective Pain    Able to rate subjective pain?  yes  -SW      Pre-Treatment Pain Level  0  -SW      Post-Treatment Pain Level  0  -SW         Total Minutes    99526 - PT Therapeutic Exercise Minutes  45  -SW         Exercise 1    Exercise Name 1  seated resting  -SW      Additional Comments  normal tone achieved.   -SW         Exercise 2    Exercise Name 2  seated QF  -SW      Sets 2  2  -SW      Reps 2  10  -SW      Additional Comments  good recruitment patterns.  Normal resting pre/post contraction.  Good amplitudes of 15 mv achieved.   -SW         Exercise 3    Exercise Name 3  seated endurance  -SW      Sets 3  2  -SW      Reps 3  10  -SW      Time 3  5  -SW      Additional Comments  Good stability.  Limited completion of reps solely due to time and desire to attempt standing and did not want effects of fatigue.    -SW         Exercise 4    Exercise Name 4  standing resting  -SW      Additional Comments  Normal baselines achieved with standing rest to PF.  Consistent and stable.  -SW         Exercise 5    Exercise Name 5   standing QF  -SW      Sets 5  2  -SW      Reps 5  10  -SW      Additional Comments  Able to recruit PF with accuracy and skill in isolation.  Full return to baseline without delay.  -         Exercise 6    Exercise Name 6  standing endurance  -SW      Sets 6  2  -SW      Reps 6  5  -SW      Time 6  10 sec  -SW      Additional Comments  Excellent stability noted in standing. Good stabilization. Isolated. Undelayed relaxation noted.   -        User Key  (r) = Recorded By, (t) = Taken By, (c) = Cosigned By    Initials Name Provider Type    Genesis Salazar, PT Physical Therapist                      PT OP Goals     Row Name 12/12/18 1400          PT Short Term Goals    STG Date to Achieve  11/01/18  -     STG 1  Patient to demonstrate diaphragmatic breathing for assist in relaxation to PF to promote improved resting tones throughout.   -     STG 1 Progress  Met  -     STG 2  Patient to demonstrate normal tolieting positions with PF to improve urination and evacuation procedures without pain and full emptying of bladder and bowel.   -     STG 2 Progress  Met  -     STG 3  patient to demonstrate normal baseline resting tones such that she is consistent at 2.9mv or less x 3 minutes while supine   -     STG 3 Progress  Met  -     STG 4  patient to recruit PF isolated movement with full return to baseline x 10 reps supine without hesitancy in return, targeting 10/10 times.   -     STG 4 Progress  Met  [facility]     STG 5  Patient to tolerate manual release to intravaginal pelvis with reports of dec tenderness to muscles by 50% reduction.   -     STG 5 Progress  Met  -        Long Term Goals    LTG Date to Achieve  01/04/19  -     LTG 1  FSFI score of 26 or greater implying dec female sexual dysfunction  -     LTG 1 Progress  Ongoing;Progressing  -     LTG 2  NIH-CPSI score of 12 or less implying improved function to PF in regards to pain.   -     LTG 2 Progress  Ongoing;Progressing  -      LTG 3  patient to improve intimacy with marinoff scale of 0-1 with improved tolerance and less pain.   -SW     LTG 3 Progress  Met  -SW     LTG 4  Resolved PF triggers to intravaginal pelvis to allow palpation without pain.   -SW     LTG 4 Progress  New  -SW     LTG 5  patient to verbalize improvements of 75% better   -SW     LTG 5 Progress  Ongoing;Progressing  -SW     LTG 5 Progress Comments  60% overall   -        Time Calculation    PT Goal Re-Cert Due Date  01/04/19  -       User Key  (r) = Recorded By, (t) = Taken By, (c) = Cosigned By    Initials Name Provider Type    Genesis Salazar, PT Physical Therapist           Therapy Education  Given: HEP  Program: Reinforced, Progressed  How Provided: Verbal, Demonstration  Provided to: Patient  Level of Understanding: Teach back education performed, Verbalized, Demonstrated              Time Calculation:   Start Time: 1408  Stop Time: 1501  Time Calculation (min): 53 min  PT Non-Billable Time (min): 10 min(due to 2 patient BR breaks during treatment)  Therapy Suggested Charges     Code   Minutes Charges    96622 (CPT®) Hc Pt Neuromusc Re Education Ea 15 Min      93630 (CPT®) Hc Pt Ther Proc Ea 15 Min 45 3    48520 (CPT®) Hc Gait Training Ea 15 Min      32779 (CPT®) Hc Pt Therapeutic Act Ea 15 Min      75406 (CPT®) Hc Pt Manual Therapy Ea 15 Min      95769 (CPT®) Hc Pt Ther Massage- Per 15 Min      82944 (CPT®) Hc Pt Iontophoresis Ea 15 Min      85124 (CPT®) Hc Pt Elec Stim Ea-Per 15 Min      93911 (CPT®) Hc Pt Ultrasound Ea 15 Min      35144 (CPT®) Hc Pt Self Care/Mgmt/Train Ea 15 Min      65908 (CPT®) Hc Pt Prosthetic (S) Train Initial Encounter, Each 15 Min      08758 (CPT®) Hc Orthotic(S) Mgmt/Train Initial Encounter, Each 15min      02723 (CPT®) Hc Pt Aquatic Therapy Ea 15 Min      21115 (CPT®) Hc Pt Orthotic(S)/Prosthetic(S) Encounter, Each 15 Min       (CPT®) Hc Pt Electrical Stim Unattended      Total  45 3        Therapy Charges for Today      Code Description Service Date Service Provider Modifiers Qty    [id number]  PT THER PROC EA 15 MIN 12/12/2018 Genesis Park, PT GP 3    48819332052 HC PT THER SUPP EA 15 MIN 12/12/2018 Genesis Park, PT GP 1                    Genesis Park, PT  12/12/2018

## 2019-01-02 ENCOUNTER — HOSPITAL ENCOUNTER (OUTPATIENT)
Dept: PHYSICAL THERAPY | Facility: HOSPITAL | Age: 44
Setting detail: THERAPIES SERIES
Discharge: HOME | End: 2019-01-02

## 2019-01-02 DIAGNOSIS — R10.2 PELVIC PAIN IN FEMALE: Primary | ICD-10-CM

## 2019-01-02 PROCEDURE — 97140 MANUAL THERAPY 1/> REGIONS: CPT | Performed by: PHYSICAL THERAPIST

## 2019-01-02 NOTE — THERAPY RE-EVALUATION
Outpatient Physical Therapy Women's Health Re-Assessment  [facility]     Patient Name: Clarence Wells  : 1975  MRN: [id number]  Today's Date: 2019        Visit Date: 2019  Visit Number: 7/  Recheck: 19  RTD: schedule pending  % improvement: 60%    There is no problem list on file for this patient.       Past Medical History:   Diagnosis Date   • Anemia    • Fluid retention in legs    • Hypertension    • Migraines    • OCD (obsessive compulsive disorder)    • Pelvic pain in female         Past Surgical History:   Procedure Laterality Date   • BILATERAL BREAST REDUCTION     • BLADDER SLING MODIFIED, ANTERIOR AND POSTERIOR VAGINAL REPAIR     • HYSTERECTOMY  2018    complete; [place]         Visit Dx:    ICD-10-CM ICD-9-CM   1. Pelvic pain in female R10.2 625.9           Women's Health     Row Name 19 1400             Pregnancy Questions    Number of Pregnancies  3  -SW      Number of Children  2  -SW      Type of Previous Deliveries  Vaginal complete episiotomy  -SW         Pelvic Floor Muscle    Strength (Right)  3: Squeeze with/without lift  -SW      Strength (Left)  3: Squeeze with/without lift  -SW      Symmetry of Sustained Maximal Contraction  Symmetrical  -SW      Endurance (Ability to Hold Maximal Contraction)  10 sec  -SW      # of Reps of Maximal Contractions while Maintaining Endurance and Strength  10 sets  -SW      Fast Contraction (# of 1 sec contractions performed)  10  -SW      Interior Muscle Comments  Mild palpational tenderness along pelvic rim of R and L sides.  Mild ttp at post vaginal floor L>R. Tissue red along inner folds of labia and at introitus.    -SW         Perineal Observation    Perineal Observation Performed?  Yes  -SW         Observation of Contraction in Perineum    Anal [place]  Present  -SW      Perineal Body Lift  Present  -SW         Pelvic Floor    Ability to Isolate Contraction of Pelvic Floor  Yes  -SW         Cough    Leak  None  -SW       Bulge  Yes  -SW         SEMG Evaluation    SEMG Evaluation Comments  declined this visit due to effective readings at last visit. Focus on manual work this date.   -SW         Education Provided On:    Education Points  HEP;Behavioral modifications  -SW      Method of Delivery  Verbal;Demonstration;Written  -SW      Education Provided To  Patient  -SW      Level of Understanding  Teach back education performed;Verbalized;Demonstrated  -SW        User Key  (r) = Recorded By, (t) = Taken By, (c) = Cosigned By    Initials Name Provider Type    Genesis Salazar PT Physical Therapist        PT Ortho     Row Name 01/02/19 1400       Subjective Comments    Subjective Comments  Doing good.  Saw MD and she got on to me for not using estrogen cream.  Insurance doesn't cover expense and patient reports probably not going to be able to use it.  Bladder is not leaking urine.  Still has a strong urge and can't delay void for long.  Intimacy is much improved.  Not painful over the course of last 2 weeks.  Had modified positions to include patient being on top and bottom help the most.  Usually hurts more when penetration is from behind. Only time she experiences pain is during intercourse.  Reports that it is not everytime but just certain ways she received penetration.   -SW       Subjective Pain    Able to rate subjective pain?  yes  -SW    Pre-Treatment Pain Level  0  -SW    Post-Treatment Pain Level  0  -SW       Posture/Observations    Posture- WNL  Posture is WNL  -SW    Posture/Observations Comments  Good spirits.  Mood and judgement are normal.   -SW      User Key  (r) = Recorded By, (t) = Taken By, (c) = Cosigned By    Initials Name Provider Type    Genesis Salazar PT Physical Therapist                     PT Assessment/Plan     Row Name 01/02/19 1400          PT Assessment    Functional Limitations  Performance in leisure activities;Performance in self-care ADL;Other (comment) intimacy with partner  -SW      Impairments  Impaired muscle endurance;Impaired muscle length;Muscle strength;Motor function;Pain  -SW     Assessment Comments  Patient reports no leakage of bladder.  Pain is at least 60% improved since starting therapy.  She is using lubricant with all intimacy relations which helps.  She has learned to modify positions to help with pain. FQ scores have improved to clinical position of no longer being classified as having Female Sexual Disorder.  Patient has not been using vaginal estrogen due to financial obligations but desires to follow MD orders.  Plans to start today with vaginal estrogen.  She is not happy to require daily application but plans to utilize to see if helps.  Patient is with good awareness to PF, its coordination and overall function.   -SW     Rehab Potential  Good  -SW     Patient/caregiver participated in establishment of treatment plan and goals  Yes  -SW     Patient would benefit from skilled therapy intervention  Yes  -SW        PT Plan    PT Frequency  3x/week  -SW     Predicted Duration of Therapy Intervention (Therapy Eval)  10-12 visits  -     PT Plan Comments  FU in 3 weeks with continual HEP application as given.  Continue with lubricant use during intimacy.  Assess success with estrogen to vaginal canal.   -       User Key  (r) = Recorded By, (t) = Taken By, (c) = Cosigned By    Initials Name Provider Type    Genesis Salazar, PT Physical Therapist            Exercises     Row Name 01/02/19 1400 01/02/19 1300          Subjective Comments    Subjective Comments  Doing good.  Saw MD and she got on to me for not using estrogen cream.  Insurance doesn't cover expense and patient reports probably not going to be able to use it.  Bladder is not leaking urine.  Still has a strong urge and can't delay void for long.  Intimacy is much improved.  Not painful over the course of last 2 weeks.  Had modified positions to include patient being on top and bottom help the most.   Usually hurts more when penetration is from behind. Only time she experiences pain is during intercourse.  Reports that it is not everytime but just certain ways she received penetration.   -SW  --        Subjective Pain    Able to rate subjective pain?  yes  -SW  --     Pre-Treatment Pain Level  0  -SW  --     Post-Treatment Pain Level  0  -SW  --        Total Minutes    23057 - PT Manual Therapy Minutes  --  45  -SW       User Key  (r) = Recorded By, (t) = Taken By, (c) = Cosigned By    Initials Name Provider Type    Genesis Salazar, PT Physical Therapist           Manual Rx (last 36 hours)      Manual Treatments     Row Name 01/02/19 1300             Total Minutes    22218 - PT Manual Therapy Minutes  45  -SW         Manual Rx 1    Manual Rx 1 Location  intravaginal tissue release  -      Manual Rx 1 Type  trigger release; MFR  -SW        User Key  (r) = Recorded By, (t) = Taken By, (c) = Cosigned By    Initials Name Provider Type    Genesis Salazar, PT Physical Therapist                    PT OP Goals     Row Name 01/02/19 1435 01/02/19 1400       PT Short Term Goals    STG Date to Achieve  --  11/01/18  -    STG 1  --  Patient to demonstrate diaphragmatic breathing for assist in relaxation to PF to promote improved resting tones throughout.   -    STG 1 Progress  --  Met  -    STG 2  --  Patient to demonstrate normal tolieting positions with PF to improve urination and evacuation procedures without pain and full emptying of bladder and bowel.   -    STG 2 Progress  --  Met  -    STG 3  --  patient to demonstrate normal baseline resting tones such that she is consistent at 2.9mv or less x 3 minutes while supine   -    STG 3 Progress  --  Met  -    STG 4  --  patient to recruit PF isolated movement with full return to baseline x 10 reps supine without hesitancy in return, targeting 10/10 times.   -    STG 4 Progress  --  Met  -    STG 5  --  Patient to tolerate manual release to  intravaginal pelvis with reports of dec tenderness to muscles by 50% reduction.   -    STG 5 Progress  --  Met  -       Long Term Goals    LTG Date to Achieve  --  01/31/19  -    LTG 1  --  FSFI score of 26 or greater implying dec female sexual dysfunction  -    LTG 1 Progress  --  Met  -    LT 1 Progress Comments  --  score of 28.7 this date.   -    LTG 2  --  NIH-CPSI score of 12 or less implying improved function to PF in regards to pain.   -    LTG 2 Progress  --  Met  -    LT 2 Progress Comments  --  Met this date with score of 12.   -    LTG 3  --  patient to improve intimacy with marinoff scale of 0-1 with improved tolerance and less pain.   -    LT 3 Progress  --  Met  -    LT 4  --  Resolved PF triggers to intravaginal pelvis to allow palpation without pain.   -    LT 4 Progress  --  New  -[facility] 5  --  patient to verbalize improvements of 75% better   -[facility] 5 Progress  --  Ongoing;Progressing  -[facility] 5 Progress Comments  --  60% improved thus far.   -       Time Calculation    PT Goal Re-Cert Due Date  01/30/19  -  01/31/19  -      User Key  (r) = Recorded By, (t) = Taken By, (c) = Cosigned By    Initials Name Provider Type    Genesis Salazar, PT Physical Therapist          Therapy Education  Given: HEP, Symptoms/condition management  Program: Reinforced  How Provided: Verbal, Demonstration  Provided to: Patient  Level of Understanding: Teach back education performed, Verbalized, Demonstrated     Outcome Measure Options: Other Outcome Measure  Other Outcome Measure Tool Used  Other Outcome Measure Tool Comments: NIH-CPSI 12; Pain 8, Urinary 1, QOL 3      Time Calculation:   Start Time: 1417  Stop Time: 1500  Time Calculation (min): 43 min  Therapy Suggested Charges     Code   Minutes Charges    27735 (CPT®) Hc Pt Neuromusc Re Education Ea 15 Min      41048 (CPT®) Hc Pt Ther Proc Ea 15 Min      78953 (CPT®) Hc Gait Training Ea 15 Min      54155 (CPT®) Hc  Pt Therapeutic Act Ea 15 Min      60058 (CPT®) Hc Pt Manual Therapy Ea 15 Min 45 3    49430 (CPT®) Hc Pt Ther Massage- Per 15 Min      43538 (CPT®) Hc Pt Iontophoresis Ea 15 Min      47445 (CPT®) Hc Pt Elec Stim Ea-Per 15 Min      10162 (CPT®) Hc Pt Ultrasound Ea 15 Min      88642 (CPT®) Hc Pt Self Care/Mgmt/Train Ea 15 Min      58298 (CPT®) Hc Pt Prosthetic (S) Train Initial Encounter, Each 15 Min      41782 (CPT®) Hc Orthotic(S) Mgmt/Train Initial Encounter, Each 15min      27662 (CPT®) Hc Pt Aquatic Therapy Ea 15 Min      16327 (CPT®) Hc Pt Orthotic(S)/Prosthetic(S) Encounter, Each 15 Min       (CPT®) Hc Pt Electrical Stim Unattended      Total  45 3        Therapy Charges for Today     Code Description Service Date Service Provider Modifiers Qty    59907287811 HC PT MANUAL THERAPY EA 15 MIN 1/2/2019 Genesis Park, PT GP 3          PT G-Codes  Outcome Measure Options: Other Outcome Measure       Genesis Park, PT  1/2/2019

## 2019-01-23 ENCOUNTER — APPOINTMENT (OUTPATIENT)
Dept: PHYSICAL THERAPY | Facility: HOSPITAL | Age: 44
End: 2019-01-23

## 2019-01-30 ENCOUNTER — HOSPITAL ENCOUNTER (OUTPATIENT)
Dept: PHYSICAL THERAPY | Facility: HOSPITAL | Age: 44
Setting detail: THERAPIES SERIES
Discharge: HOME | End: 2019-01-30

## 2019-01-30 DIAGNOSIS — R10.2 PELVIC PAIN IN FEMALE: Primary | ICD-10-CM

## 2019-01-30 PROCEDURE — 97110 THERAPEUTIC EXERCISES: CPT | Performed by: PHYSICAL THERAPIST

## 2019-01-30 NOTE — THERAPY DISCHARGE NOTE
Outpatient Physical Therapy Women's Health Treatment Note/Discharge Summary  [facility]     Patient Name: Clarence Wells  : 1975  MRN: [id number]  Today's Date: 2019        Visit Date: 2019  Visit Number: 8  DC date: 19  RTD: pending  % improvement: 80%    Visit Dx:    ICD-10-CM ICD-9-CM   1. Pelvic pain in female R10.2 625.9       There is no problem list on file for this patient.       Women's Health     Row Name 19 1400             Subjective Comments    Subjective Comments  Has been really busy with LIFE.  Hasn't had any problems with intercourse when she has had intercourse.  Managing well.  Urinating well.  Emptying bladder well.  No complications to note.  Bowels moving good without pain or problems. Has not started vaginal estrogen because she can't afford it.  No FU to her knowledge with MD.  Feels at this time she can manage I.  Subjective improvement of at least 80%.  -SW         Pregnancy Questions    Number of Pregnancies  3  -SW      Number of Children  2  -SW      Type of Previous Deliveries  Vaginal complete episiotomy  -SW         Subjective Pain    Pre-Treatment Pain Level  0  -SW      Post-Treatment Pain Level  0  -SW         Pelvic Floor Muscle    Strength (Right)  3: Squeeze with/without lift  -SW      Strength (Left)  3: Squeeze with/without lift  -SW      Symmetry of Sustained Maximal Contraction  Symmetrical  -SW      Endurance (Ability to Hold Maximal Contraction)  10 sec  -SW      # of Reps of Maximal Contractions while Maintaining Endurance and Strength  10 sets  -SW      Fast Contraction (# of 1 sec contractions performed)  10  -SW      Interior Muscle Comments  No palpation completed this date per patient request.   -SW         Perineal Observation    Perineal Observation Performed?  Yes  -SW         Observation of Contraction in Perineum    Anal [place]  Present  -SW      Perineal Body Lift  Present  -SW         Pelvic Floor    Ability to  Isolate Contraction of Pelvic Floor  --  -SW         Cough    Leak  --  -SW      Bulge  --  -SW         SEMG Evaluation    SEMG Evaluation Comments  patient declined  -SW         Education Provided On:    Education Points  HEP;Behavioral modifications  -SW      Method of Delivery  Verbal;Demonstration;Written  -      Education Provided To  Patient  -SW      Level of Understanding  Teach back education performed;Verbalized;Demonstrated  -SW      HEP Comments  Review HEP as instructed.   -        User Key  (r) = Recorded By, (t) = Taken By, (c) = Cosigned By    Initials Name Provider Type    Genesis Salazar, PT Physical Therapist        PT Ortho     Row Name 01/30/19 1400       Subjective Pain    Able to rate subjective pain?  yes  -SW       Posture/Observations    Posture- WNL  Posture is WNL  -SW    Posture/Observations Comments  Mood and judgement normal.  Full gait and transfers without assist.    -      User Key  (r) = Recorded By, (t) = Taken By, (c) = Cosigned By    Initials Name Provider Type    Genesis Salazar, PT Physical Therapist                     PT Assessment/Plan     Row Name 01/30/19 1400          PT Assessment    Assessment Comments  Patient is managing very well at this time.  She reports having no vaginal pain with intercourse or during bladder and bowel evacuation.  Patient feels that all her goals are achieved and ready to begin I HEP.  She has not been using vaginal estrogen due to financial expense, but managing well without.    -        PT Plan    PT Frequency  -- DC this date  -SW     PT Plan Comments  DC this date to begin independent program  -       User Key  (r) = Recorded By, (t) = Taken By, (c) = Cosigned By    Initials Name Provider Type    Genesis Salazar, PT Physical Therapist              Exercises     Row Name 01/30/19 1400             Subjective Comments    Subjective Comments  Has been really busy with LIFE.  Hasn't had any problems with  intercourse when she has had intercourse.  Managing well.  Urinating well.  Emptying bladder well.  No complications to note.  Bowels moving good without pain or problems. Has not started vaginal estrogen because she can't afford it.  No FU to her knowledge with MD.  Feels at this time she can manage I.  Subjective improvement of at least 80%.  -         Subjective Pain    Able to rate subjective pain?  yes  -SW      Pre-Treatment Pain Level  0  -SW      Post-Treatment Pain Level  0  -SW         Total Minutes    89249 - PT Therapeutic Exercise Minutes  30  -SW         Exercise 1    Exercise Name 1  Review HEP and home management   -        User Key  (r) = Recorded By, (t) = Taken By, (c) = Cosigned By    Initials Name Provider Type    SW Genesis Park, PT Physical Therapist                      PT OP Goals     Row Name 01/30/19 1400          PT Short Term Goals    STG Date to Achieve  11/01/18  -     STG 1  Patient to demonstrate diaphragmatic breathing for assist in relaxation to PF to promote improved resting tones throughout.   -     STG 1 Progress  Met  -     STG 2  Patient to demonstrate normal tolieting positions with PF to improve urination and evacuation procedures without pain and full emptying of bladder and bowel.   -     STG 2 Progress  Met  -     STG 3  patient to demonstrate normal baseline resting tones such that she is consistent at 2.9mv or less x 3 minutes while supine   -     STG 3 Progress  Met  -     STG 4  patient to recruit PF isolated movement with full return to baseline x 10 reps supine without hesitancy in return, targeting 10/10 times.   -     STG 4 Progress  Met  [facility]     STG 5  Patient to tolerate manual release to intravaginal pelvis with reports of dec tenderness to muscles by 50% reduction.   -     STG 5 Progress  Met  -        Long Term Goals    LTG Date to Achieve  01/31/19  -     LTG 1  FSFI score of 26 or greater implying dec female sexual  dysfunction  -SW     LTG 1 Progress  Met  -SW     LTG 2  NIH-CPSI score of 12 or less implying improved function to PF in regards to pain.   -SW     LTG 2 Progress  Met  -SW     LTG 3  patient to improve intimacy with marinoff scale of 0-1 with improved tolerance and less pain.   -SW     LTG 3 Progress  Met  -SW     LTG 4  Resolved PF triggers to intravaginal pelvis to allow palpation without pain.   -SW     LTG 4 Progress  New  -SW     LTG 5  patient to verbalize improvements of 75% better   -     LTG 5 Progress  Met  -SW       User Key  (r) = Recorded By, (t) = Taken By, (c) = Cosigned By    Initials Name Provider Type    Genesis Salazar, PT Physical Therapist           Therapy Education  Given: HEP, Symptoms/condition management, Pain management, Other (comment)(behavioral management)  Program: Reinforced  How Provided: Verbal  Provided to: Patient  Level of Understanding: Verbalized    Outcome Measure Options: Other Outcome Measure  Other Outcome Measure Tool Used  Other Outcome Measure Tool Comments: NIH-CPSI 2; pain 1, urinary 0, QOL 1    Time Calculation:   Start Time: 1402  Stop Time: 1433  Time Calculation (min): 31 min    Therapy Suggested Charges     Code   Minutes Charges    26570 (CPT®) Hc Pt Neuromusc Re Education Ea 15 Min      20896 (CPT®) Hc Pt Ther Proc Ea 15 Min 30 2    49339 (CPT®) Hc Gait Training Ea 15 Min      86269 (CPT®) Hc Pt Therapeutic Act Ea 15 Min      48393 (CPT®) Hc Pt Manual Therapy Ea 15 Min      03862 (CPT®) Hc Pt Ther Massage- Per 15 Min      18603 (CPT®) Hc Pt Iontophoresis Ea 15 Min      61212 (CPT®) Hc Pt Elec Stim Ea-Per 15 Min      92745 (CPT®) Hc Pt Ultrasound Ea 15 Min      17989 (CPT®) Hc Pt Self Care/Mgmt/Train Ea 15 Min      94808 (CPT®) Hc Pt Prosthetic (S) Train Initial Encounter, Each 15 Min      82484 (CPT®) Hc Orthotic(S) Mgmt/Train Initial Encounter, Each 15min      33521 (CPT®) Hc Pt Aquatic Therapy Ea 15 Min      57193 (CPT®) Hc Pt  Orthotic(S)/Prosthetic(S) Encounter, Each 15 Min       (CPT®) Hc Pt Electrical Stim Unattended      Total  30 2          Therapy Charges for Today     Code Description Service Date Service Provider Modifiers Qty    70257030185 HC PT THER PROC EA 15 MIN 1/30/2019 Genesis Park, PT GP 2          PT G-Codes  Outcome Measure Options: Other Outcome Measure    OP PT Discharge Summary  Date of Discharge: 01/30/19  Reason for Discharge: All goals achieved  Outcomes Achieved: Able to achieve all goals within established timeline  Discharge Destination: Home with home program  Discharge Instructions/Additional Comments: See chart notes for DC instruction and HEP established.      Genesis Park, PT  1/30/2019